# Patient Record
Sex: MALE | Race: ASIAN | NOT HISPANIC OR LATINO | URBAN - METROPOLITAN AREA
[De-identification: names, ages, dates, MRNs, and addresses within clinical notes are randomized per-mention and may not be internally consistent; named-entity substitution may affect disease eponyms.]

---

## 2021-07-13 VITALS
SYSTOLIC BLOOD PRESSURE: 147 MMHG | DIASTOLIC BLOOD PRESSURE: 74 MMHG | RESPIRATION RATE: 16 BRPM | TEMPERATURE: 97 F | HEART RATE: 63 BPM | OXYGEN SATURATION: 97 %

## 2021-07-13 RX ORDER — CHLORHEXIDINE GLUCONATE 213 G/1000ML
1 SOLUTION TOPICAL ONCE
Refills: 0 | Status: DISCONTINUED | OUTPATIENT
Start: 2021-07-16 | End: 2021-07-30

## 2021-07-13 NOTE — H&P ADULT - NSHPLABSRESULTS_GEN_ALL_CORE
15.1   4.56  )-----------( 217      ( 16 Jul 2021 11:01 )             46.0               PT/INR - ( 16 Jul 2021 11:01 )   PT: 12.4 sec;   INR: 1.04          PTT - ( 16 Jul 2021 11:01 )  PTT:38.9 sec              EKG: 15.1   4.56  )-----------( 217      ( 16 Jul 2021 11:01 )             46.0               PT/INR - ( 16 Jul 2021 11:01 )   PT: 12.4 sec;   INR: 1.04          PTT - ( 16 Jul 2021 11:01 )  PTT:38.9 sec              EKG: NSR at 63 bpm. No ST changes. TWI III., Q wave III

## 2021-07-13 NOTE — H&P ADULT - HISTORY OF PRESENT ILLNESS
History obtained via [facility] Cardiology note  Verify meds    Pharmacy: [facility] [id number], [phone]  Escort:  Cardiologist: Dr. Fabio Sheriff  Covid:    69 yo M, smoker, PMHx HTN, HLD, MELVIN (on CPAP) who presented to Cardiologist Dr. Fabio Sheriff with c/o intermittent, non-radiating SSCP described as pressure and of mild intensity on moderate physical exertion, alleviated with few minutes of rest, over past few weeks. Pt also c/o B/L foot heaviness upon ambulation of 1-3 city blocks or after climbing 1-2 flights of stairs, alleviated with rest, over past few months.  Denies palpitations, dizziness, syncope, GRAHAM, orthopnea, PND, peripheral edema.    CCTA 6/14/21: mLAD moderate, RI tiny vessel mild, pRCA mild. ECHO 4/26/21: LVEF 55%, mild MR/TR    In light of pt's risk factors, CCS Anginal Class II/III Sx, abnormal CCTA, pt referred for cardiac cath with possible intervention to r/o underlying CAD.  History obtained via [facility] Cardiology note  Medications verified by pharmacy, check to see if he takes ASA OTC    Pharmacy: [id number], [phone]  Escort:  Cardiologist: Dr. Fabio Sheriff  Covid:    71 yo M, smoker, PMHx HTN, HLD, MELVIN (on CPAP) who presented to Cardiologist Dr. Fabio Sheriff with c/o intermittent, non-radiating SSCP described as pressure and of mild intensity on moderate physical exertion, alleviated with few minutes of rest, over past few weeks. Pt also c/o B/L foot heaviness upon ambulation of 1-3 city blocks or after climbing 1-2 flights of stairs, alleviated with rest, over past few months.  Denies palpitations, dizziness, syncope, GRAHAM, orthopnea, PND, peripheral edema.    CCTA 6/14/21: mLAD moderate, RI tiny vessel mild, pRCA mild. ECHO 4/26/21: LVEF 55%, mild MR/TR    In light of pt's risk factors, CCS Anginal Class II/III Sx, abnormal CCTA, pt referred for cardiac cath with possible intervention to r/o underlying CAD.  History obtained via [facility] Cardiology note  Medications verified by pharmacy    Pharmacy: [facility] [id number], [phone]  Escort:  Cardiologist: Dr. Fabio Sheriff  Covid: Vaccinated Pfizer 2nd dose 3/2021    69 yo Cantonese speaking M, smoker, PMHx HTN, HLD, MELVIN (on CPAP), pre DM (Hgb A1C 6.3% on 7/3/2021)  who presented to Cardiologist Dr. Fabio Sheriff with c/o intermittent, non-radiating SSCP described as pressure and of mild intensity on moderate physical exertion, alleviated with few minutes of rest, over past few weeks. Pt also c/o B/L foot heaviness upon ambulation of 1-3 city blocks or after climbing 1-2 flights of stairs, alleviated with rest, over past few months.  Denies palpitations, dizziness, syncope, GRAHAM, orthopnea, PND, peripheral edema.    CCTA 6/14/21: mLAD moderate, RI tiny vessel mild, pRCA mild. ECHO 4/26/21: LVEF 55%, mild MR/TR    In light of pt's risk factors, CCS Anginal Class II/III Sx, abnormal CCTA, pt referred for cardiac cath with possible intervention to r/o underlying CAD.  History obtained via [facility] Cardiology note  Medications verified by pharmacy    Pharmacy: [facility] [id number], [phone]  Escort:  Cardiologist: Dr. Fabio Sheriff  Covid: Vaccinated Pfizer 2nd dose 3/2021    69 yo Cantonese speaking M PMHx HTN, HLD, MELVIN (on CPAP), pre DM (Hgb A1C 6.3% on 7/3/2021)  who presented to Cardiologist Dr. Fabio Sheriff with c/o intermittent, non-radiating SSCP described as pressure and of mild intensity on moderate physical exertion, alleviated with few minutes of rest, over past few weeks. Pt also c/o B/L foot heaviness upon ambulation of 1-3 city blocks or after climbing 1-2 flights of stairs, alleviated with rest, over past few months.  Denies palpitations, dizziness, syncope, GRAHAM, orthopnea, PND, peripheral edema.    CCTA 6/14/21: mLAD moderate, RI tiny vessel mild, pRCA mild. ECHO 4/26/21: LVEF 55%, mild MR/TR    In light of pt's risk factors, CCS Anginal Class II/III Sx, abnormal CCTA, pt referred for cardiac cath with possible intervention to r/o underlying CAD.

## 2021-07-13 NOTE — H&P ADULT - ASSESSMENT
69 yo Cantonese speaking M, smoker, PMHx HTN, HLD, MELVIN (on CPAP), pre DM (Hgb A1C 6.3% on 7/3/2021) who presents for cardiac cath with possible intervention due to patient's risk factors, CCS Angina Class II Symptoms, and abnormal CTA Coronaries.    ASA III           Mallampati III    Patient is a candidate for sedation: Yes  Moderate Sedation     Risks & benefits of procedure and alternative therapy have been explained to the patient including but not limited to: allergic reaction, bleeding w/possible need for blood transfusion, infection, renal and vascular compromise, limb damage, arrhythmia, stroke, vessel dissection/perforation, Myocardial infarction, emergent CABG. Informed consent obtained and in chart.     Hgb/HCT normal 15.1/46. Patient denies bleeding, BRBPR, melena, hematuria, hematemesis. Patient takes ASA 81 mg daily and reports compliance last dose 7/16/2021 AM. Plavix 600 mg PO x 1 given prior to procedure.

## 2021-07-16 ENCOUNTER — OUTPATIENT (OUTPATIENT)
Dept: OUTPATIENT SERVICES | Facility: HOSPITAL | Age: 71
LOS: 1 days | Discharge: HOME | End: 2021-07-16
Payer: MEDICARE

## 2021-07-16 LAB
A1C WITH ESTIMATED AVERAGE GLUCOSE RESULT: 6 % — HIGH (ref 4–5.6)
ALBUMIN SERPL ELPH-MCNC: 4.7 G/DL — SIGNIFICANT CHANGE UP (ref 3.3–5)
ALP SERPL-CCNC: 69 U/L — SIGNIFICANT CHANGE UP (ref 40–120)
ALT FLD-CCNC: 22 U/L — SIGNIFICANT CHANGE UP (ref 10–45)
ANION GAP SERPL CALC-SCNC: 10 MMOL/L — SIGNIFICANT CHANGE UP (ref 5–17)
ANION GAP SERPL CALC-SCNC: 13 MMOL/L — SIGNIFICANT CHANGE UP (ref 5–17)
APTT BLD: 38.9 SEC — HIGH (ref 27.5–35.5)
AST SERPL-CCNC: 23 U/L — SIGNIFICANT CHANGE UP (ref 10–40)
BASOPHILS # BLD AUTO: 0.05 K/UL — SIGNIFICANT CHANGE UP (ref 0–0.2)
BASOPHILS NFR BLD AUTO: 1.1 % — SIGNIFICANT CHANGE UP (ref 0–2)
BILIRUB SERPL-MCNC: 0.8 MG/DL — SIGNIFICANT CHANGE UP (ref 0.2–1.2)
BUN SERPL-MCNC: 16 MG/DL — SIGNIFICANT CHANGE UP (ref 7–23)
BUN SERPL-MCNC: 16 MG/DL — SIGNIFICANT CHANGE UP (ref 7–23)
CALCIUM SERPL-MCNC: 9 MG/DL — SIGNIFICANT CHANGE UP (ref 8.4–10.5)
CALCIUM SERPL-MCNC: 9.5 MG/DL — SIGNIFICANT CHANGE UP (ref 8.4–10.5)
CHLORIDE SERPL-SCNC: 103 MMOL/L — SIGNIFICANT CHANGE UP (ref 96–108)
CHLORIDE SERPL-SCNC: 103 MMOL/L — SIGNIFICANT CHANGE UP (ref 96–108)
CHOLEST SERPL-MCNC: 187 MG/DL — SIGNIFICANT CHANGE UP
CK MB CFR SERPL CALC: 2.3 NG/ML — SIGNIFICANT CHANGE UP (ref 0–6.7)
CK SERPL-CCNC: 228 U/L — HIGH (ref 30–200)
CO2 SERPL-SCNC: 24 MMOL/L — SIGNIFICANT CHANGE UP (ref 22–31)
CO2 SERPL-SCNC: 25 MMOL/L — SIGNIFICANT CHANGE UP (ref 22–31)
CREAT SERPL-MCNC: 1.05 MG/DL — SIGNIFICANT CHANGE UP (ref 0.5–1.3)
CREAT SERPL-MCNC: 1.14 MG/DL — SIGNIFICANT CHANGE UP (ref 0.5–1.3)
EOSINOPHIL # BLD AUTO: 0.17 K/UL — SIGNIFICANT CHANGE UP (ref 0–0.5)
EOSINOPHIL NFR BLD AUTO: 3.7 % — SIGNIFICANT CHANGE UP (ref 0–6)
ESTIMATED AVERAGE GLUCOSE: 126 MG/DL — HIGH (ref 68–114)
GLUCOSE SERPL-MCNC: 114 MG/DL — HIGH (ref 70–99)
GLUCOSE SERPL-MCNC: 124 MG/DL — HIGH (ref 70–99)
HCT VFR BLD CALC: 39.9 % — SIGNIFICANT CHANGE UP (ref 39–50)
HCT VFR BLD CALC: 46 % — SIGNIFICANT CHANGE UP (ref 39–50)
HDLC SERPL-MCNC: 38 MG/DL — LOW
HGB BLD-MCNC: 13.7 G/DL — SIGNIFICANT CHANGE UP (ref 13–17)
HGB BLD-MCNC: 15.1 G/DL — SIGNIFICANT CHANGE UP (ref 13–17)
IMM GRANULOCYTES NFR BLD AUTO: 0.2 % — SIGNIFICANT CHANGE UP (ref 0–1.5)
INR BLD: 1.04 — SIGNIFICANT CHANGE UP (ref 0.88–1.16)
LIPID PNL WITH DIRECT LDL SERPL: 120 MG/DL — HIGH
LYMPHOCYTES # BLD AUTO: 1.43 K/UL — SIGNIFICANT CHANGE UP (ref 1–3.3)
LYMPHOCYTES # BLD AUTO: 31.4 % — SIGNIFICANT CHANGE UP (ref 13–44)
MAGNESIUM SERPL-MCNC: 2.1 MG/DL — SIGNIFICANT CHANGE UP (ref 1.6–2.6)
MAGNESIUM SERPL-MCNC: 2.1 MG/DL — SIGNIFICANT CHANGE UP (ref 1.6–2.6)
MCHC RBC-ENTMCNC: 31.3 PG — SIGNIFICANT CHANGE UP (ref 27–34)
MCHC RBC-ENTMCNC: 32.5 PG — SIGNIFICANT CHANGE UP (ref 27–34)
MCHC RBC-ENTMCNC: 32.8 GM/DL — SIGNIFICANT CHANGE UP (ref 32–36)
MCHC RBC-ENTMCNC: 34.3 GM/DL — SIGNIFICANT CHANGE UP (ref 32–36)
MCV RBC AUTO: 94.5 FL — SIGNIFICANT CHANGE UP (ref 80–100)
MCV RBC AUTO: 95.4 FL — SIGNIFICANT CHANGE UP (ref 80–100)
MONOCYTES # BLD AUTO: 0.32 K/UL — SIGNIFICANT CHANGE UP (ref 0–0.9)
MONOCYTES NFR BLD AUTO: 7 % — SIGNIFICANT CHANGE UP (ref 2–14)
NEUTROPHILS # BLD AUTO: 2.58 K/UL — SIGNIFICANT CHANGE UP (ref 1.8–7.4)
NEUTROPHILS NFR BLD AUTO: 56.6 % — SIGNIFICANT CHANGE UP (ref 43–77)
NON HDL CHOLESTEROL: 149 MG/DL — HIGH
NRBC # BLD: 0 /100 WBCS — SIGNIFICANT CHANGE UP (ref 0–0)
NRBC # BLD: 0 /100 WBCS — SIGNIFICANT CHANGE UP (ref 0–0)
PLATELET # BLD AUTO: 194 K/UL — SIGNIFICANT CHANGE UP (ref 150–400)
PLATELET # BLD AUTO: 217 K/UL — SIGNIFICANT CHANGE UP (ref 150–400)
POTASSIUM SERPL-MCNC: 3.7 MMOL/L — SIGNIFICANT CHANGE UP (ref 3.5–5.3)
POTASSIUM SERPL-MCNC: 4.1 MMOL/L — SIGNIFICANT CHANGE UP (ref 3.5–5.3)
POTASSIUM SERPL-SCNC: 3.7 MMOL/L — SIGNIFICANT CHANGE UP (ref 3.5–5.3)
POTASSIUM SERPL-SCNC: 4.1 MMOL/L — SIGNIFICANT CHANGE UP (ref 3.5–5.3)
PROT SERPL-MCNC: 7.7 G/DL — SIGNIFICANT CHANGE UP (ref 6–8.3)
PROTHROM AB SERPL-ACNC: 12.4 SEC — SIGNIFICANT CHANGE UP (ref 10.6–13.6)
RBC # BLD: 4.22 M/UL — SIGNIFICANT CHANGE UP (ref 4.2–5.8)
RBC # BLD: 4.82 M/UL — SIGNIFICANT CHANGE UP (ref 4.2–5.8)
RBC # FLD: 11.9 % — SIGNIFICANT CHANGE UP (ref 10.3–14.5)
RBC # FLD: 11.9 % — SIGNIFICANT CHANGE UP (ref 10.3–14.5)
SODIUM SERPL-SCNC: 138 MMOL/L — SIGNIFICANT CHANGE UP (ref 135–145)
SODIUM SERPL-SCNC: 140 MMOL/L — SIGNIFICANT CHANGE UP (ref 135–145)
TRIGL SERPL-MCNC: 143 MG/DL — SIGNIFICANT CHANGE UP
WBC # BLD: 4.56 K/UL — SIGNIFICANT CHANGE UP (ref 3.8–10.5)
WBC # BLD: 4.62 K/UL — SIGNIFICANT CHANGE UP (ref 3.8–10.5)
WBC # FLD AUTO: 4.56 K/UL — SIGNIFICANT CHANGE UP (ref 3.8–10.5)
WBC # FLD AUTO: 4.62 K/UL — SIGNIFICANT CHANGE UP (ref 3.8–10.5)

## 2021-07-16 PROCEDURE — C1725: CPT

## 2021-07-16 PROCEDURE — C1887: CPT

## 2021-07-16 PROCEDURE — 93454 CORONARY ARTERY ANGIO S&I: CPT | Mod: 59

## 2021-07-16 PROCEDURE — 82550 ASSAY OF CK (CPK): CPT

## 2021-07-16 PROCEDURE — C1874: CPT

## 2021-07-16 PROCEDURE — 85025 COMPLETE CBC W/AUTO DIFF WBC: CPT

## 2021-07-16 PROCEDURE — 93005 ELECTROCARDIOGRAM TRACING: CPT

## 2021-07-16 PROCEDURE — 93010 ELECTROCARDIOGRAM REPORT: CPT | Mod: 76

## 2021-07-16 PROCEDURE — C1894: CPT

## 2021-07-16 PROCEDURE — 80053 COMPREHEN METABOLIC PANEL: CPT

## 2021-07-16 PROCEDURE — 85610 PROTHROMBIN TIME: CPT

## 2021-07-16 PROCEDURE — 82553 CREATINE MB FRACTION: CPT

## 2021-07-16 PROCEDURE — 80061 LIPID PANEL: CPT

## 2021-07-16 PROCEDURE — 85027 COMPLETE CBC AUTOMATED: CPT

## 2021-07-16 PROCEDURE — 92978 ENDOLUMINL IVUS OCT C 1ST: CPT | Mod: LD

## 2021-07-16 PROCEDURE — 99152 MOD SED SAME PHYS/QHP 5/>YRS: CPT

## 2021-07-16 PROCEDURE — C9600: CPT | Mod: LD

## 2021-07-16 PROCEDURE — 83735 ASSAY OF MAGNESIUM: CPT

## 2021-07-16 PROCEDURE — C1769: CPT

## 2021-07-16 PROCEDURE — 80048 BASIC METABOLIC PNL TOTAL CA: CPT

## 2021-07-16 PROCEDURE — 85730 THROMBOPLASTIN TIME PARTIAL: CPT

## 2021-07-16 PROCEDURE — 99153 MOD SED SAME PHYS/QHP EA: CPT

## 2021-07-16 PROCEDURE — 83036 HEMOGLOBIN GLYCOSYLATED A1C: CPT

## 2021-07-16 PROCEDURE — C1753: CPT

## 2021-07-16 RX ORDER — SODIUM CHLORIDE 9 MG/ML
500 INJECTION INTRAMUSCULAR; INTRAVENOUS; SUBCUTANEOUS
Refills: 0 | Status: DISCONTINUED | OUTPATIENT
Start: 2021-07-16 | End: 2021-07-30

## 2021-07-16 RX ORDER — LOSARTAN/HYDROCHLOROTHIAZIDE 100MG-25MG
1 TABLET ORAL
Qty: 0 | Refills: 0 | DISCHARGE

## 2021-07-16 RX ORDER — SODIUM CHLORIDE 9 MG/ML
500 INJECTION INTRAMUSCULAR; INTRAVENOUS; SUBCUTANEOUS
Refills: 0 | Status: DISCONTINUED | OUTPATIENT
Start: 2021-07-16 | End: 2021-07-16

## 2021-07-16 RX ORDER — CLOPIDOGREL BISULFATE 75 MG/1
600 TABLET, FILM COATED ORAL ONCE
Refills: 0 | Status: COMPLETED | OUTPATIENT
Start: 2021-07-16 | End: 2021-07-16

## 2021-07-16 RX ORDER — CLOPIDOGREL BISULFATE 75 MG/1
1 TABLET, FILM COATED ORAL
Qty: 30 | Refills: 11
Start: 2021-07-16 | End: 2022-07-10

## 2021-07-16 RX ORDER — ATORVASTATIN CALCIUM 80 MG/1
1 TABLET, FILM COATED ORAL
Qty: 0 | Refills: 0 | DISCHARGE

## 2021-07-16 RX ORDER — CLOPIDOGREL BISULFATE 75 MG/1
1 TABLET, FILM COATED ORAL
Qty: 30 | Refills: 0
Start: 2021-07-16 | End: 2021-08-14

## 2021-07-16 RX ORDER — EZETIMIBE 10 MG/1
1 TABLET ORAL
Qty: 0 | Refills: 0 | DISCHARGE

## 2021-07-16 RX ORDER — POTASSIUM CHLORIDE 20 MEQ
40 PACKET (EA) ORAL ONCE
Refills: 0 | Status: COMPLETED | OUTPATIENT
Start: 2021-07-16 | End: 2021-07-16

## 2021-07-16 RX ORDER — ASPIRIN/CALCIUM CARB/MAGNESIUM 324 MG
1 TABLET ORAL
Qty: 0 | Refills: 0 | DISCHARGE

## 2021-07-16 RX ADMIN — SODIUM CHLORIDE 75 MILLILITER(S): 9 INJECTION INTRAMUSCULAR; INTRAVENOUS; SUBCUTANEOUS at 11:38

## 2021-07-16 RX ADMIN — SODIUM CHLORIDE 75 MILLILITER(S): 9 INJECTION INTRAMUSCULAR; INTRAVENOUS; SUBCUTANEOUS at 16:09

## 2021-07-16 RX ADMIN — CLOPIDOGREL BISULFATE 600 MILLIGRAM(S): 75 TABLET, FILM COATED ORAL at 11:38

## 2021-07-16 RX ADMIN — Medication 40 MILLIEQUIVALENT(S): at 18:16

## 2021-07-16 NOTE — PROGRESS NOTE ADULT - SUBJECTIVE AND OBJECTIVE BOX
Interventional Cardiology PA Post PCI SDA Discharge Note    Patient without complaints. Ambulated and voided without difficulties    Afebrile, VSS    Ext: Right Radial: no hematoma, no bleeding, dressing C/D/I    Pulses: intact RAD to baseline     A/P: 71 y/o Cantonese speaking male w/ PMHx HTN, HLD, MELVIN (on CPAP), and pre-DM (Hgb A1C 6.3% on 07/03/2021) who presented to cardiologist, Dr. Fabio Sheriff, c/o intermittent, non-radiating substernal chest pain described as pressure and of mild intensity on moderate physical exertion, alleviated w/ few minutes of rest, over the past few weeks. Patient also c/o bilateral foot heaviness upon ambulation of 1-3 city blocks or after climbing 1-2 flights of stairs, alleviated w/ rest, over the past few months. Patient denied palpitations, dizziness, syncope, GRAHAM, orthopnea, PND, and peripheral edema. CCTA (06/14/2021) revealed mLAD moderate, RI tiny vessel mild, and pRCA mild. Echocardiogram (04/26/2021) showed LVEF 55% and mild MR/TR. In light of patient's risk factors, CCS Anginal Class II/III Symptoms, and abnormal CCTA results, patient was referred for cardiac catheterization w/ possible intervention if clinically indicated.     Patient is now s/p cardiac catheterization w/ PTCA/DIONICIO mid LAD and other findings of LM normal, LCx mild disease, and proximal RCA 50%. Right radial access was utilized and radial band was eventually removed appropriately and w/o complications. Labs and EKG were reviewed post-procedure and remained stable.       1. Follow-up with PMD/Cardiologist Dr. Sheriff in 72 hours.  2. Post procedure labs/EKG reviewed and stable.    3. Patient given instructions on importance of taking antiplatelet medication.    4. Stable for discharge as per attending Dr. Ambriz after bed rest, patient voids, wrist stable and 30 minutes of ambulation.  5. Prescriptions for Aspirin/Plavix e-prescribed to patient's preferred pharmacy.   6. Patient will continue Atorvastatin 10 mg daily.   7. Discharge forms signed and copies in chart

## 2021-07-17 ENCOUNTER — EMERGENCY (EMERGENCY)
Facility: HOSPITAL | Age: 71
LOS: 1 days | Discharge: HOME | End: 2021-07-17
Attending: EMERGENCY MEDICINE | Admitting: EMERGENCY MEDICINE
Payer: MEDICARE

## 2021-07-17 VITALS
HEIGHT: 66 IN | HEART RATE: 74 BPM | TEMPERATURE: 98 F | OXYGEN SATURATION: 97 % | SYSTOLIC BLOOD PRESSURE: 132 MMHG | DIASTOLIC BLOOD PRESSURE: 84 MMHG | RESPIRATION RATE: 18 BRPM | WEIGHT: 149.91 LBS

## 2021-07-17 VITALS
HEART RATE: 65 BPM | DIASTOLIC BLOOD PRESSURE: 70 MMHG | OXYGEN SATURATION: 98 % | SYSTOLIC BLOOD PRESSURE: 125 MMHG | RESPIRATION RATE: 16 BRPM

## 2021-07-17 DIAGNOSIS — R07.89 OTHER CHEST PAIN: ICD-10-CM

## 2021-07-17 DIAGNOSIS — I10 ESSENTIAL (PRIMARY) HYPERTENSION: ICD-10-CM

## 2021-07-17 DIAGNOSIS — G47.33 OBSTRUCTIVE SLEEP APNEA (ADULT) (PEDIATRIC): ICD-10-CM

## 2021-07-17 DIAGNOSIS — Z79.01 LONG TERM (CURRENT) USE OF ANTICOAGULANTS: ICD-10-CM

## 2021-07-17 DIAGNOSIS — E78.5 HYPERLIPIDEMIA, UNSPECIFIED: ICD-10-CM

## 2021-07-17 DIAGNOSIS — Z95.1 PRESENCE OF AORTOCORONARY BYPASS GRAFT: ICD-10-CM

## 2021-07-17 LAB
ALBUMIN SERPL ELPH-MCNC: 4 G/DL — SIGNIFICANT CHANGE UP (ref 3.3–5)
ALP SERPL-CCNC: SIGNIFICANT CHANGE UP U/L (ref 40–120)
ALT FLD-CCNC: SIGNIFICANT CHANGE UP U/L (ref 10–45)
ANION GAP SERPL CALC-SCNC: 10 MMOL/L — SIGNIFICANT CHANGE UP (ref 5–17)
APTT BLD: 29.7 SEC — SIGNIFICANT CHANGE UP (ref 27.5–35.5)
AST SERPL-CCNC: SIGNIFICANT CHANGE UP U/L (ref 10–40)
BASOPHILS # BLD AUTO: 0.02 K/UL — SIGNIFICANT CHANGE UP (ref 0–0.2)
BASOPHILS NFR BLD AUTO: 0.4 % — SIGNIFICANT CHANGE UP (ref 0–2)
BILIRUB SERPL-MCNC: 0.9 MG/DL — SIGNIFICANT CHANGE UP (ref 0.2–1.2)
BUN SERPL-MCNC: 14 MG/DL — SIGNIFICANT CHANGE UP (ref 7–23)
CALCIUM SERPL-MCNC: 9.2 MG/DL — SIGNIFICANT CHANGE UP (ref 8.4–10.5)
CHLORIDE SERPL-SCNC: 101 MMOL/L — SIGNIFICANT CHANGE UP (ref 96–108)
CK MB CFR SERPL CALC: 1.8 NG/ML — SIGNIFICANT CHANGE UP (ref 0–6.7)
CK SERPL-CCNC: SIGNIFICANT CHANGE UP U/L (ref 30–200)
CO2 SERPL-SCNC: 23 MMOL/L — SIGNIFICANT CHANGE UP (ref 22–31)
CREAT SERPL-MCNC: 1.04 MG/DL — SIGNIFICANT CHANGE UP (ref 0.5–1.3)
EOSINOPHIL # BLD AUTO: 0.13 K/UL — SIGNIFICANT CHANGE UP (ref 0–0.5)
EOSINOPHIL NFR BLD AUTO: 2.4 % — SIGNIFICANT CHANGE UP (ref 0–6)
GLUCOSE SERPL-MCNC: 119 MG/DL — HIGH (ref 70–99)
HCT VFR BLD CALC: 43 % — SIGNIFICANT CHANGE UP (ref 39–50)
HGB BLD-MCNC: 14.3 G/DL — SIGNIFICANT CHANGE UP (ref 13–17)
IMM GRANULOCYTES NFR BLD AUTO: 0.2 % — SIGNIFICANT CHANGE UP (ref 0–1.5)
INR BLD: 1.05 — SIGNIFICANT CHANGE UP (ref 0.88–1.16)
LYMPHOCYTES # BLD AUTO: 0.89 K/UL — LOW (ref 1–3.3)
LYMPHOCYTES # BLD AUTO: 16.6 % — SIGNIFICANT CHANGE UP (ref 13–44)
MCHC RBC-ENTMCNC: 32 PG — SIGNIFICANT CHANGE UP (ref 27–34)
MCHC RBC-ENTMCNC: 33.3 GM/DL — SIGNIFICANT CHANGE UP (ref 32–36)
MCV RBC AUTO: 96.2 FL — SIGNIFICANT CHANGE UP (ref 80–100)
MONOCYTES # BLD AUTO: 0.24 K/UL — SIGNIFICANT CHANGE UP (ref 0–0.9)
MONOCYTES NFR BLD AUTO: 4.5 % — SIGNIFICANT CHANGE UP (ref 2–14)
NEUTROPHILS # BLD AUTO: 4.08 K/UL — SIGNIFICANT CHANGE UP (ref 1.8–7.4)
NEUTROPHILS NFR BLD AUTO: 75.9 % — SIGNIFICANT CHANGE UP (ref 43–77)
NRBC # BLD: 0 /100 WBCS — SIGNIFICANT CHANGE UP (ref 0–0)
PLATELET # BLD AUTO: 186 K/UL — SIGNIFICANT CHANGE UP (ref 150–400)
POTASSIUM SERPL-MCNC: SIGNIFICANT CHANGE UP MMOL/L (ref 3.5–5.3)
POTASSIUM SERPL-SCNC: SIGNIFICANT CHANGE UP MMOL/L (ref 3.5–5.3)
PROT SERPL-MCNC: 7 G/DL — SIGNIFICANT CHANGE UP (ref 6–8.3)
PROTHROM AB SERPL-ACNC: 12.6 SEC — SIGNIFICANT CHANGE UP (ref 10.6–13.6)
RBC # BLD: 4.47 M/UL — SIGNIFICANT CHANGE UP (ref 4.2–5.8)
RBC # FLD: 12.2 % — SIGNIFICANT CHANGE UP (ref 10.3–14.5)
SODIUM SERPL-SCNC: 134 MMOL/L — LOW (ref 135–145)
TROPONIN T SERPL-MCNC: <0.01 NG/ML — SIGNIFICANT CHANGE UP (ref 0–0.01)
WBC # BLD: 5.37 K/UL — SIGNIFICANT CHANGE UP (ref 3.8–10.5)
WBC # FLD AUTO: 5.37 K/UL — SIGNIFICANT CHANGE UP (ref 3.8–10.5)

## 2021-07-17 PROCEDURE — 99283 EMERGENCY DEPT VISIT LOW MDM: CPT | Mod: 25

## 2021-07-17 PROCEDURE — 85025 COMPLETE CBC W/AUTO DIFF WBC: CPT

## 2021-07-17 PROCEDURE — 71045 X-RAY EXAM CHEST 1 VIEW: CPT | Mod: 26

## 2021-07-17 PROCEDURE — 99283 EMERGENCY DEPT VISIT LOW MDM: CPT | Mod: GC

## 2021-07-17 PROCEDURE — 36415 COLL VENOUS BLD VENIPUNCTURE: CPT

## 2021-07-17 PROCEDURE — 82550 ASSAY OF CK (CPK): CPT

## 2021-07-17 PROCEDURE — 93005 ELECTROCARDIOGRAM TRACING: CPT

## 2021-07-17 PROCEDURE — 82553 CREATINE MB FRACTION: CPT

## 2021-07-17 PROCEDURE — 85610 PROTHROMBIN TIME: CPT

## 2021-07-17 PROCEDURE — 85730 THROMBOPLASTIN TIME PARTIAL: CPT

## 2021-07-17 PROCEDURE — 80053 COMPREHEN METABOLIC PANEL: CPT

## 2021-07-17 PROCEDURE — 99285 EMERGENCY DEPT VISIT HI MDM: CPT

## 2021-07-17 PROCEDURE — 71045 X-RAY EXAM CHEST 1 VIEW: CPT

## 2021-07-17 PROCEDURE — 84484 ASSAY OF TROPONIN QUANT: CPT

## 2021-07-17 NOTE — CONSULT NOTE ADULT - SUBJECTIVE AND OBJECTIVE BOX
HPI:    This is a 71 y/o Cantonese speaking male w/ PMHx HTN, HLD, MELVIN (on CPAP), and pre-DM (Hgb A1C 6.3% on 07/03/2021) who presented to cardiologist, Dr. Fabio Sheriff, c/o intermittent, non-radiating substernal chest pain described as pressure and of mild intensity on moderate physical exertion, alleviated w/ few minutes of rest, over the past few weeks. On his OP workup, CCTA (06/14/2021) revealed mLAD moderate, RI tiny vessel mild, and pRCA mild. Echocardiogram (04/26/2021) showed LVEF 55% and mild MR/TR. In light of patient's risk factors, CCS Anginal Class II/III Symptoms, and abnormal CCTA results, patient was referred for cardiac catheterization on 7/16. Patient is now s/p cardiac catheterization w/ PTCA/DIONICIO mid LAD and other findings of LM normal, LCx mild disease, and proximal RCA 50%. Right radial access was utilized and radial band was eventually removed appropriately and w/o complications. Patient was discharged on aspirin and Plavix. Cardiology consulted today as patient returned to the ED for chest pressure. Per daughter at bedside, last night patient woke up to the bathroom and felt mild chest pressure. He did not have any chest pain/ SOB/ GRAHAM at the time. The chest pressure lasted several hours so he came to the ED for evaluation. Since presenting to the ED, his pressure has resolved without any medications. EKG was done that showed Normal Sinus Rhythm with no ST/T wave abnormalities.     ROS: A 10-point review of systems was otherwise negative.    PAST MEDICAL & SURGICAL HISTORY:  Hypertension    Hyperlipidemia    MELVIN on CPAP    No significant past surgical history        SOCIAL HISTORY:  FAMILY HISTORY:  No pertinent family history in first degree relatives        ALLERGIES: 	  No Known Allergies            MEDICATIONS:      PHYSICAL EXAM:  T(C): 36.8 (07-17-21 @ 09:32), Max: 36.9 (07-17-21 @ 08:00)  HR: 71 (07-17-21 @ 09:32) (65 - 74)  BP: 131/70 (07-17-21 @ 09:32) (121/70 - 132/84)  RR: 16 (07-17-21 @ 09:32) (16 - 18)  SpO2: 98% (07-17-21 @ 09:32) (97% - 98%)  Wt(kg): --    GEN: sitting, NAD   HEENT: NCAT, PERRL, EOMI. Mucosa moist. No JVD.   RESP: CTA b/l  CV: RRR, normal s1/s2. No m/r/g. No pain on palpation  ABD: Soft, NTND. BS+  EXT: R wrist with no hematoma. Warm. No edema peripherally   NEURO: AAOx3. No focal deficits.    I&O's Summary    Height (cm): 167.6 (07-17 @ 08:00)  Weight (kg): 68 (07-17 @ 08:00)  BMI (kg/m2): 24.2 (07-17 @ 08:00)  BSA (m2): 1.77 (07-17 @ 08:00)  	  LABS:	 	    CARDIAC MARKERS:                                  14.3   5.37  )-----------( 186      ( 17 Jul 2021 09:51 )             43.0     07-17    134<L>  |  101  |  14  ----------------------------<  119<H>  see note   |  23  |  1.04    Ca    9.2      17 Jul 2021 09:51  Mg     2.1     07-16    TPro  7.0  /  Alb  4.0  /  TBili  0.9  /  DBili  x   /  AST  see note  /  ALT  see note  /  AlkPhos  see note  07-17    proBNP:   Lipid Profile:   HgA1c:   TSH:     TELEMETRY: 	    ECG:  	  RADIOLOGY:   ECHO:  STRESS:  CATH:

## 2021-07-17 NOTE — ED PROVIDER NOTE - PATIENT PORTAL LINK FT
You can access the FollowMyHealth Patient Portal offered by [facility] by registering at the following website: [email or web address]. By joining [facility]’s FollowMyHealth portal, you will also be able to view your health information using other applications (apps) compatible with our system.

## 2021-07-17 NOTE — CONSULT NOTE ADULT - TIME BILLING
I have personally provided 30 minutes of clinical care time concurrently with the fellow.  This time excludes time spent on separate procedures and time spent teaching. I have reviewed the fellow’s documentation and I agree with the fellow’s assessment and plan of care.  FLORENTIN Campos

## 2021-07-17 NOTE — CONSULT NOTE ADULT - ASSESSMENT
This is a 69 y/o Cantonese speaking male w/ PMHx HTN, HLD, MELVIN (on CPAP), and pre-DM who presented yesterday for staged PCI. Patient is now s/p cardiac catheterization w/ PTCA/DIONICIO mid LAD and other findings of LM normal, LCx mild disease, and proximal RCA 50%. Patient was discharged on aspirin and Plavix. Cardiology consulted today as patient returned to the ED for chest pressure.       #Chest pressure   Per daughter at bedside, last night patient woke up to the bathroom and felt mild chest pressure. He did not have any chest pain/ SOB/ GRAHAM at the time. The chest pressure lasted several hours so he came to the ED for evaluation. Since presenting to the ED, his pressure has resolved without any medications. Currently he is walking around the ED without any issues.   - EKG was done that showed Normal Sinus Rhythm with no ST/T wave abnormalities  - Cardiac enzymes negative  - Patient has been compliant with his aspirin and Plavix  - Given resolution of symptoms, EKG without ischemic changes and negative cardiac biomarkers, suspicion is low for in-stent thrombosis   - Pt has an appt on Tuesday with his Cardiologist Dr. Sheriff  - Continue ASA 81mg and Plavix 75mg    Patient to follow up with Cardiology as OP. Dispo per ED  Case discussed with attending

## 2021-07-17 NOTE — ED PROVIDER NOTE - PHYSICAL EXAMINATION
CONSTITUTIONAL: Well-appearing;  in no apparent distress.   HEAD: Normocephalic; atraumatic.   EYES: PERRL; EOM intact; conjunctiva and sclera clear  ENT: normal nose; no rhinorrhea; normal pharynx with no erythema or lesions.   NECK: Supple; non-tender; no LAD  CARDIOVASCULAR: Normal S1, S2; No audible murmurs. Regular rate and rhythm.   RESPIRATORY: Breathing easily; breath sounds clear and equal bilaterally; no wheezes, rhonchi, or rales.  GI: Soft; non-distended; non-tender; no palpable organomegaly.   MSK: FROM at all extremities, normal tone   EXT: No cyanosis or edema; N/V intact  SKIN: Normal for age and race; warm; dry; good turgor; no apparent lesions or rash. R radial site clean, dry, no hematoma   NEURO: A & O x 3; face symmetric; grossly unremarkable.   PSYCHOLOGICAL: The patient’s mood and manner are appropriate.

## 2021-07-17 NOTE — ED ADULT NURSE NOTE - OBJECTIVE STATEMENT
70 y.o M a&ox4 walked in from triage c.o CP. pt had mid LAD stent placed yesterday through R radial artery. Pt reports having chest pressure around 12 pm last night and became diaphoretic. then felt the CP again at 2 pm. reports L sided, non- radiating CP. currently reports Chest pressure while laying in stretcher. placed on CCM, NSR. had chills last night, denies fevers. states that the Air condition was on last night and thinks that's why he had chills. PMH of HTN, HLD. took asa and plavix this am. cantonese  used  [id number] (kaylyn)

## 2021-07-17 NOTE — CONSULT NOTE ADULT - ATTENDING COMMENTS
69 y/o Cantonese speaking male w/ PMHx HTN, HLD, MELVIN (on CPAP), and pre-DM (Hgb A1C 6.3% on 07/03/2021) who presented to cardiologist, Dr. Fabio Sheriff, c/o intermittent, non-radiating substernal chest pain described as pressure and of mild intensity on moderate physical exertion, alleviated w/ few minutes of rest, over the past few weeks. On his OP workup, CCTA (06/14/2021) revealed mLAD moderate, RI tiny vessel mild, and pRCA mild. Echocardiogram (04/26/2021) showed LVEF 55% and mild MR/TR. In light of patient's risk factors, CCS Anginal Class II/III Symptoms, and abnormal CCTA results, patient was referred for cardiac catheterization on 7/16. Patient is now s/p cardiac catheterization w/ PTCA/DIONICIO mid LAD and other findings of LM normal, LCx mild disease, and proximal RCA 50%. Right radial access was utilized and radial band was eventually removed appropriately and w/o complications. Patient was discharged on aspirin and Plavix. Cardiology consulted today as patient returned to the ED for chest pressure. Per daughter at bedside, last night patient woke up to the bathroom and felt mild chest pressure. He did not have any chest pain/ SOB/ GRAHAM at the time. The chest pressure lasted several hours so he came to the ED for evaluation. Since presenting to the ED, his pressure has resolved without any medications. EKG was done that showed Normal Sinus Rhythm with no ST/T wave abnormalities.   Daughter translated    #Chest pressure   Per daughter at bedside, last night patient woke up to the bathroom and felt mild chest pressure. He did not have any chest pain/ SOB/ GRAHAM at the time. The chest pressure lasted several hours so he came to the ED for evaluation. Since presenting to the ED, his pressure has resolved without any medications. Currently he is walking around the ED without any issues.   - EKG was done that showed Normal Sinus Rhythm with no ST/T wave abnormalities  - Cardiac enzymes negative  - Patient has been compliant with his aspirin and Plavix  - Given resolution of symptoms, EKG without ischemic changes and negative cardiac biomarkers, suspicion is low for in-stent thrombosis   - Pt has an appt on Tuesday with his Cardiologist Dr. Sheriff  - Continue ASA 81mg and Plavix 75mg    Patient to follow up with Cardiology as OP.  I have personally provided 30 minutes of clinical care time concurrently with the fellow.  This time excludes time spent on separate procedures and time spent teaching. I have reviewed the fellow’s documentation and I agree with the fellow’s assessment and plan of care.  FLORENTIN Campos

## 2021-07-17 NOTE — ED PROVIDER NOTE - ATTENDING CONTRIBUTION TO CARE
71yo M hx of HTN, HLD, pre-DM, stent to mid LAD yesterday here, here w/ CP to L sternal border 3x w/ associated diaphoresis. still w/ pain now but not as severe as before. feels worse than the CP that caused him to get a cath.  compliant w/ asa plavix. VSS, well appearing middle aged male, R radial site c/d/i, no hematoma seen. EKG w/ no acute ischemia. Cards consulted immediately upon pts arrival.

## 2021-07-17 NOTE — ED PROVIDER NOTE - CLINICAL SUMMARY MEDICAL DECISION MAKING FREE TEXT BOX
71 yo M with pmh of HTN, HLD, re DM s/p 1 stent to mLAD yesterday at [facility] c/o LSB chest pressure. Pt had 3 episodes one at 12am, 3am and 5am. Associated with sweats. Symptoms lasted about 10 min. Now feels mild discomfort. Denies fever, chills, sob, cough, n/v, dizziness, palpitations. VSs. EKG NSR, no ischemic changes. Trop neg. Seen by cards fellow. Spoke with Dr. Sheriff pts cardiologist, both agree no concern for re-stenosis, will f/u with Dr. Sheriff on Tuesday. Return precautions discussed.

## 2021-07-20 DIAGNOSIS — I25.84 CORONARY ATHEROSCLEROSIS DUE TO CALCIFIED CORONARY LESION: ICD-10-CM

## 2021-07-20 DIAGNOSIS — R93.1 ABNORMAL FINDINGS ON DIAGNOSTIC IMAGING OF HEART AND CORONARY CIRCULATION: ICD-10-CM

## 2021-07-20 DIAGNOSIS — I25.10 ATHEROSCLEROTIC HEART DISEASE OF NATIVE CORONARY ARTERY WITHOUT ANGINA PECTORIS: ICD-10-CM

## 2023-06-02 NOTE — ED PROVIDER NOTE - NSFOLLOWUPINSTRUCTIONS_ED_ALL_ED_FT
[id number]
Follow up with Dr. Sheriff on Tuesday.     Chest Pain    Chest pain can be caused by many different conditions which may or may not be dangerous. Causes include heartburn, lung infections, heart attack, blood clot in lungs, skin infections, strain or damage to muscle, cartilage, or bones, etc. In addition to a history and physical examination, an electrocardiogram (ECG) or other lab tests may have been performed to determine the cause of your chest pain. Follow up with your primary care provider or with a cardiologist as instructed.     SEEK IMMEDIATE MEDICAL CARE IF YOU HAVE ANY OF THE FOLLOWING SYMPTOMS: worsening chest pain, coughing up blood, unexplained back/neck/jaw pain, severe abdominal pain, dizziness or lightheadedness, fainting, shortness of breath, sweaty or clammy skin, vomiting, or racing heart beat. These symptoms may represent a serious problem that is an emergency. Do not wait to see if the symptoms will go away. Get medical help right away. Call 911 and do not drive yourself to the hospital.
